# Patient Record
(demographics unavailable — no encounter records)

---

## 2025-05-01 NOTE — HISTORY OF PRESENT ILLNESS
[Mother] : mother [whole ___ oz/d] : consumes [unfilled] oz of whole cow's milk per day [Fruit] : fruit [Vegetables] : vegetables [Meat] : meat [Grains] : grains [Dairy] : dairy [Normal] : Normal [Brushing teeth] : Brushing teeth [Yes] : Patient goes to dentist yearly [Toothpaste] : Primary Fluoride Source: Toothpaste [Playtime (60 min/d)] : Playtime 60 min a day [Appropiate parent-child-sibling interaction] : Appropriate parent-child-sibling interaction [Parent has appropriate responses to behavior] : Parent has appropriate responses to behavior [In ] : In  [Adequate performance] : Adequate performance [Adequate attention] : Adequate attention [No difficulties with Homework] : No difficulties with homework  [No] : No cigarette smoke exposure [Water heater temperature set at <120 degrees F] : Water heater temperature set at <120 degrees F [Car seat in back seat] : Car seat in back seat [Carbon Monoxide Detectors] : Carbon monoxide detectors [Smoke Detectors] : Smoke detectors [Supervised outdoor play] : Supervised outdoor play [Up to date] : Up to date [NO] : No

## 2025-05-01 NOTE — DEVELOPMENTAL MILESTONES
[Dresses and undresses without help] : dresses and undresses without help [Goes to the bathroom independently] : goes to bathroom independently [Is dry through the day] :  is dry through the day [Plays and interacts with peer] : plays and interacts with peer [Answers "why" questions] : does not answer "why" questions [Tells a story of 2 sentences or more] : tells a story of 2 sentences or more [Follows directions for 4 individual] : follows directions for 4 individual prepositions [Counts 5 objects] : counts 5 objects [Names 3 or more numbers] : names 3 or more numbers [Names 4 or more letters out of order] : names 4 or more letters out of order [Is beginning to skip] : is beginning to skip [Walks on tiptoes when asked] : walks on tiptoes when asked [Catches a bounced ball with] : catches a bounced ball with 2 hands [Copies a triangle] : copies a triangle [Copies first name] : copies first name [FreeTextEntry1] : speech tx

## 2025-05-01 NOTE — DISCUSSION/SUMMARY
[FreeTextEntry1] : Well 5 year old Growth and development: normal Discussed safety/anticipatory guidance Discussed school readiness/transition Discussed need for vaccines, reviewed side effects and VIS PPD/assess TB risk (prior to school entry) Next PE: in 1 year  Discussed and/or provided information on the following: SCHOOL READINESS: Established routines; after-school care and activities; parent-teacher communications; friends; bullying; maturity; management of disappointments; fears MENTAL HEALTH: Family time; routines; temper problems; social interventions NUTRITION: Healthy weight; appropriate well-balanced diet; increased fruit, vegetable, and whole grain consumption; adequate calcium intake PHYSICAL ACTIVITY: 60 minutes of exercise a day; playing a sport ORAL HEALTH: Regular visits with dentist; daily brushing and flossing; adequate fluoride SAFETY: Pedestrian safety; booster seat; safety helmets; swimming safety; child sexual abuse prevention; fire escape/drill plan and smoke detectors, carbon monoxide detectors/alarms; guns continue speech tx